# Patient Record
Sex: MALE | Race: WHITE
[De-identification: names, ages, dates, MRNs, and addresses within clinical notes are randomized per-mention and may not be internally consistent; named-entity substitution may affect disease eponyms.]

---

## 2018-10-19 ENCOUNTER — HOSPITAL ENCOUNTER (EMERGENCY)
Dept: HOSPITAL 80 - FED | Age: 28
Discharge: ANOTHER HOSPITAL | End: 2018-10-19
Payer: OTHER GOVERNMENT

## 2018-10-19 VITALS — SYSTOLIC BLOOD PRESSURE: 122 MMHG | DIASTOLIC BLOOD PRESSURE: 81 MMHG

## 2018-10-19 DIAGNOSIS — S02.602A: Primary | ICD-10-CM

## 2018-10-19 DIAGNOSIS — S02.40DA: ICD-10-CM

## 2018-10-19 DIAGNOSIS — Z23: ICD-10-CM

## 2018-10-19 DIAGNOSIS — S02.40CA: ICD-10-CM

## 2018-10-19 DIAGNOSIS — J39.8: ICD-10-CM
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
## 2018-10-19 DIAGNOSIS — E86.9: ICD-10-CM

## 2018-10-19 DIAGNOSIS — S02.40FA: ICD-10-CM

## 2018-10-19 DIAGNOSIS — S02.82XA: ICD-10-CM

## 2018-10-19 LAB — PLATELET # BLD: 329 10^3/UL (ref 150–400)

## 2018-10-19 PROCEDURE — G0480 DRUG TEST DEF 1-7 CLASSES: HCPCS

## 2018-10-19 PROCEDURE — 0BH17EZ INSERTION OF ENDOTRACHEAL AIRWAY INTO TRACHEA, VIA NATURAL OR ARTIFICIAL OPENING: ICD-10-PCS
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
